# Patient Record
Sex: FEMALE | Race: WHITE | NOT HISPANIC OR LATINO
[De-identification: names, ages, dates, MRNs, and addresses within clinical notes are randomized per-mention and may not be internally consistent; named-entity substitution may affect disease eponyms.]

---

## 2021-12-27 ENCOUNTER — APPOINTMENT (OUTPATIENT)
Dept: OTOLARYNGOLOGY | Facility: CLINIC | Age: 21
End: 2021-12-27
Payer: COMMERCIAL

## 2021-12-27 VITALS
DIASTOLIC BLOOD PRESSURE: 70 MMHG | SYSTOLIC BLOOD PRESSURE: 110 MMHG | HEART RATE: 102 BPM | HEIGHT: 63 IN | TEMPERATURE: 98.2 F | WEIGHT: 147 LBS | OXYGEN SATURATION: 98 % | BODY MASS INDEX: 26.05 KG/M2

## 2021-12-27 DIAGNOSIS — R13.19 OTHER DYSPHAGIA: ICD-10-CM

## 2021-12-27 DIAGNOSIS — J31.0 CHRONIC RHINITIS: ICD-10-CM

## 2021-12-27 PROCEDURE — 31575 DIAGNOSTIC LARYNGOSCOPY: CPT

## 2021-12-27 PROCEDURE — 99203 OFFICE O/P NEW LOW 30 MIN: CPT | Mod: 25

## 2021-12-27 RX ORDER — FLUTICASONE PROPIONATE 50 UG/1
50 SPRAY, METERED NASAL TWICE DAILY
Qty: 1 | Refills: 2 | Status: ACTIVE | COMMUNITY
Start: 2021-12-27 | End: 1900-01-01

## 2021-12-27 NOTE — PROCEDURE
[FreeTextEntry1] : Flexible fiberoptic laryngoscopy [FreeTextEntry2] : Post nasal drop [FreeTextEntry3] : Procedure: Flexible fiberoptic laryngoscopy\par \par Pre-operative diagnosis: post nasal drip\par \par Details:\par After decongestant and lidocaine was sprayed in the bilateral nasal cavities, a flexible laryngoscope was inserted into the right nares. The nasal cavity, middle meatus, nasopharynx, and glottis were visualized. The endoscope was then inserted into the left nares and the nasal cavity was visualized. The patient tolerated procedure well.\par \par Results:\par Right nasal cavity: clear without masses or lesions, clear secretions\par Right inferior turbinate: normal\par Right middle turbinate: normal\par Right middle meatus: normal without masses, pus\par Right ETO: normal\par Left nasal cavity: clear without masses or lesions, clear secretions\par Left inferior turbinate: normal\par Left middle turbinate: normal\par Left middle meatus: normal without masses, pus\par Left ETO: normal\par Nasopharynx: normal without masses or lesions\par Base of tongue: clear\par Vallecula: Clear\par Secretions: normal\par Glottis: Vocal cords mobile and symmetric, piriform sinus clear, normal AE folds, arytenoids\par Normal appearing subglottis.\par \par Findings:\par Normal exam, thick secretions

## 2021-12-27 NOTE — ASSESSMENT
[FreeTextEntry1] : 21 year old female with chronic rhinitis. She has a hyperactive gag reflex, otherwise her uvula is normal and not elongated. I recommended neilmed irrigations BID, Flonase, NSS.

## 2021-12-27 NOTE — HISTORY OF PRESENT ILLNESS
[No] : patient does not have a  history of radiation therapy [None] : No relevant exposures have been identified. [de-identified] : 21 year old female with PND for two months. She has been seeing her PCP. About a few weeks ago, she felt like something was gagging her throat. PCP noted an elongated a uvula so prescribed claritin and prednisone 20 mg daily for ten days. It did not improve so PCP prescribed augmentin, which she is taking. She has had no change. She has never had throat or tonsil infections. This all started with a post nasal drip feeling in October. Denies fevers. reports some dysphagia. She does have reflux - she takes pantoprazole 10 mg once a day for it. Denies food allergies. She does have seasonal allergies. She had sensitivity to latex as a child. 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 She has some global developmental delay so sees a peds neurologist for that. She had brain atrophy at 8 months, and failure to thrive, so had a feeding tube. Has known hyperactive gag reflex.